# Patient Record
Sex: MALE | URBAN - METROPOLITAN AREA
[De-identification: names, ages, dates, MRNs, and addresses within clinical notes are randomized per-mention and may not be internally consistent; named-entity substitution may affect disease eponyms.]

---

## 2020-07-07 ENCOUNTER — APPOINTMENT (RX ONLY)
Age: 45
Setting detail: DERMATOLOGY
End: 2020-07-07

## 2020-07-07 VITALS — TEMPERATURE: 98.2 F

## 2020-07-07 DIAGNOSIS — L30.9 DERMATITIS, UNSPECIFIED: ICD-10-CM

## 2020-07-07 PROCEDURE — 99202 OFFICE O/P NEW SF 15 MIN: CPT

## 2020-07-07 PROCEDURE — ? DIAGNOSIS COMMENT

## 2020-07-07 PROCEDURE — ? PRESCRIPTION

## 2020-07-07 RX ORDER — HYDROXYZINE HYDROCHLORIDE 25 MG/1
TABLET, FILM COATED ORAL
Qty: 60 | Refills: 2 | Status: ERX | COMMUNITY
Start: 2020-07-07

## 2020-07-07 RX ADMIN — HYDROXYZINE HYDROCHLORIDE: 25 TABLET, FILM COATED ORAL at 00:00

## 2020-07-07 NOTE — PROCEDURE: DIAGNOSIS COMMENT
Comment: He mainly has pruritus without significant rash. Groin shows some hyperpigmentation only. No evidence of fungal infection in groin (KOH prep done.)\\nWill increase dose of hydroxyzine, but discussed at length the risk of drowsiness, especially when driving. He will take in the morning only on days he does not work, at least at first, to see how it affects him, in terms of drowsiness. Hydroxyzine seems to have helped the most thus far.\\nHe will follow-up in clinic if not improved in 1-2 months, sooner if needed. Consider lab testing if itch does not improve.
Detail Level: Zone